# Patient Record
Sex: MALE | Race: WHITE | NOT HISPANIC OR LATINO | Employment: FULL TIME | URBAN - METROPOLITAN AREA
[De-identification: names, ages, dates, MRNs, and addresses within clinical notes are randomized per-mention and may not be internally consistent; named-entity substitution may affect disease eponyms.]

---

## 2017-03-28 ENCOUNTER — GENERIC CONVERSION - ENCOUNTER (OUTPATIENT)
Dept: OTHER | Facility: OTHER | Age: 37
End: 2017-03-28

## 2017-06-14 ENCOUNTER — ALLSCRIPTS OFFICE VISIT (OUTPATIENT)
Dept: OTHER | Facility: OTHER | Age: 37
End: 2017-06-14

## 2017-11-22 ENCOUNTER — ALLSCRIPTS OFFICE VISIT (OUTPATIENT)
Dept: OTHER | Facility: OTHER | Age: 37
End: 2017-11-22

## 2017-11-23 NOTE — PROCEDURES
Assessment    1  Encounter for vasectomy (V25 2) (Z30 2)    Plan  Encounter for vasectomy    · Oxycodone-Acetaminophen 5-325 MG Oral Tablet; Take 1 to 2 tablets every 4 to 6hours as needed for pain   Rx By: Ryland Maynard; Dispense: 0 Days ; #:12 Tablet; Refill: 0;Encounter for vasectomy; RUBÉN = N; Print Rx   · Follow-up visit in 3 weeks Evaluation and Treatment  Follow-up  Status: Hold For -Scheduling  Requested for: 22HTP2464   Ordered;Encounter for vasectomy; Ordered By: Ryland Maynard Performed:  Due: 62VIK1019   · (1) TISSUE EXAM; Status: In Progress - Specimen/Data Collected,Retrospective ByProtocol Authorization;   Done: [id number]   Perform:[facility]; JNY:[id number]; Last Updated Priscilla Sheehan; 11/22/2017 2:41:15 PM;Ordered;for vasectomy; Ordered By:Laura Ferrera;  B : RIGHT VAS DEFERENS  B Date/Time: : [id number]  B : Vas Deferens  A : LEFT VAS DEFERENS  A Date/Time: : [id number]  A : Vas Deferens  Impression: : Z30 2    Discussion/Summary  Discussion Summary:   patient is status post vasectomy  I recommended rest, ice, and scrotal support  I've asked that he return in the next 2-3 weeks for a post vasectomy check  I have prescribed Vicodin to take as needed  The patient understands that he is not yet considered sterile  I have recommended semen analyses at 6 and 8 weeks post procedure  In the interim I have recommended contraception to avoid an undesired pregnancy  Chief Complaint  Chief Complaint Free Text Note Form: Patient presents for Vasectomy - consent signed , in EHR      History of Present Illness  HPI: Chelle Prabhakar is a 41-year-old male who returns to undergo elective sterilization with vasectomy  Risk and benefits of the procedure were discussed and reviewed  Informed consent was previously obtained  The patient has taken Ativan as prescribed  Active Problems  1  Encounter for vasectomy (V25 2) (Z30 2)   2   Encounter for vasectomy counseling (V25 09) (Z30 09)    Surgical History  1  History of Arthroscopy Shoulder Right   2  History of Oral Surgery Tooth Extraction [place] Tooth    Social History     ·    · Never a smoker   · Social alcohol use (Z78 9)    Current Meds   1  LORazepam 2 MG Oral Tablet; one tablet one hour prior to procedure; Therapy: 00UZS2332 to (Evaluate:15Jun2017); Last Rx:14Jun2017 Ordered    Allergies  1  No Known Drug Allergies    Vitals  Vital Signs    Recorded: 22Nov2017 02:11PM   Heart Rate 60   Systolic 805   Diastolic 76   Height 6 ft    Weight 221 lb    BMI Calculated 29 97   BSA Calculated 2 22       Procedure  Procedure Note:  and benefits of vasectomy were previously discussed  Informed consent was obtained at his prior office visit  The patient had taken Ativan as prescribed  patient was placed in the supine position  His genitalia was prepped and draped in a sterile fashion  The left vas deferens was grasped and presented to the midline of the scrotum  2% lidocaine was used to anesthetize the skin, subcutaneous tissue, and left vas deferens  A scalpeless opening was made in the midline of the scrotum  The left vas deferens was grasped and presented into the surgical field  A #15 blade was used to make a longitudinal incision in the sheath of the vas deferens  The vas itself was then dissected free from the surrounding tissue  Clamps were placed proximally and distally and a 1 cm segment of the vas deferens was excised  Silk ties were applied and exposed ends were fulgurated  Hemostasis was excellent  The vas was returned to its natural anatomic position and the same procedure was then repeated on the patient's right side  A single stitch was placed through the skin and dartos to reapproximate the defect  Bacitracin ointment was applied        Future Appointments    Date/Time Provider Specialty Site   12/06/2017 02:30 PM Damaso Bueno, Min Daniels  Urology  [address]       Signatures   Electronically signed by : Wilber Hooks MD; Nov 22 2017  2:56PM EST                       (Author)

## 2017-12-06 ENCOUNTER — GENERIC CONVERSION - ENCOUNTER (OUTPATIENT)
Dept: OTHER | Facility: OTHER | Age: 37
End: 2017-12-06

## 2018-01-12 VITALS
SYSTOLIC BLOOD PRESSURE: 122 MMHG | HEIGHT: 72 IN | WEIGHT: 224 LBS | DIASTOLIC BLOOD PRESSURE: 84 MMHG | HEART RATE: 60 BPM | BODY MASS INDEX: 30.34 KG/M2

## 2018-01-13 VITALS
BODY MASS INDEX: 29.93 KG/M2 | SYSTOLIC BLOOD PRESSURE: 120 MMHG | DIASTOLIC BLOOD PRESSURE: 76 MMHG | HEART RATE: 60 BPM | HEIGHT: 72 IN | WEIGHT: 221 LBS

## 2018-01-24 VITALS
HEIGHT: 72 IN | BODY MASS INDEX: 31.42 KG/M2 | SYSTOLIC BLOOD PRESSURE: 124 MMHG | DIASTOLIC BLOOD PRESSURE: 80 MMHG | WEIGHT: 232 LBS

## 2018-04-18 ENCOUNTER — APPOINTMENT (EMERGENCY)
Dept: CT IMAGING | Facility: HOSPITAL | Age: 38
End: 2018-04-18
Payer: COMMERCIAL

## 2018-04-18 ENCOUNTER — ANESTHESIA EVENT (EMERGENCY)
Dept: PERIOP | Facility: HOSPITAL | Age: 38
End: 2018-04-18
Payer: COMMERCIAL

## 2018-04-18 ENCOUNTER — HOSPITAL ENCOUNTER (OUTPATIENT)
Facility: HOSPITAL | Age: 38
Discharge: HOME | End: 2018-04-19
Attending: EMERGENCY MEDICINE | Admitting: SURGERY
Payer: COMMERCIAL

## 2018-04-18 ENCOUNTER — ANESTHESIA (EMERGENCY)
Dept: PERIOP | Facility: HOSPITAL | Age: 38
End: 2018-04-18
Payer: COMMERCIAL

## 2018-04-18 DIAGNOSIS — K35.80 ACUTE APPENDICITIS: Primary | ICD-10-CM

## 2018-04-18 LAB
ALBUMIN SERPL BCP-MCNC: 4.4 G/DL (ref 3.5–5)
ALP SERPL-CCNC: 92 U/L (ref 46–116)
ALT SERPL W P-5'-P-CCNC: 21 U/L (ref 12–78)
ANION GAP SERPL CALCULATED.3IONS-SCNC: 7 MMOL/L (ref 4–13)
AST SERPL W P-5'-P-CCNC: 19 U/L (ref 5–45)
BASOPHILS # BLD AUTO: 0.03 THOUSANDS/ΜL (ref 0–0.1)
BASOPHILS NFR BLD AUTO: 0 % (ref 0–1)
BILIRUB SERPL-MCNC: 0.67 MG/DL (ref 0.2–1)
BILIRUB UR QL STRIP: NEGATIVE
BUN SERPL-MCNC: 11 MG/DL (ref 5–25)
CALCIUM SERPL-MCNC: 9.3 MG/DL (ref 8.3–10.1)
CHLORIDE SERPL-SCNC: 103 MMOL/L (ref 100–108)
CLARITY UR: CLEAR
CLARITY, POC: CLEAR
CO2 SERPL-SCNC: 30 MMOL/L (ref 21–32)
COLOR UR: YELLOW
COLOR, POC: YELLOW
CREAT SERPL-MCNC: 0.85 MG/DL (ref 0.6–1.3)
EOSINOPHIL # BLD AUTO: 0.12 THOUSAND/ΜL (ref 0–0.61)
EOSINOPHIL NFR BLD AUTO: 1 % (ref 0–6)
ERYTHROCYTE [DISTWIDTH] IN BLOOD BY AUTOMATED COUNT: 12.5 % (ref 11.6–15.1)
GFR SERPL CREATININE-BSD FRML MDRD: 110 ML/MIN/1.73SQ M
GLUCOSE SERPL-MCNC: 107 MG/DL (ref 65–140)
GLUCOSE UR STRIP-MCNC: NEGATIVE MG/DL
HCT VFR BLD AUTO: 42.3 % (ref 36.5–49.3)
HGB BLD-MCNC: 15 G/DL (ref 12–17)
HGB UR QL STRIP.AUTO: NEGATIVE
KETONES UR STRIP-MCNC: NEGATIVE MG/DL
LEUKOCYTE ESTERASE UR QL STRIP: NEGATIVE
LIPASE SERPL-CCNC: 174 U/L (ref 73–393)
LYMPHOCYTES # BLD AUTO: 1.2 THOUSANDS/ΜL (ref 0.6–4.47)
LYMPHOCYTES NFR BLD AUTO: 8 % (ref 14–44)
MCH RBC QN AUTO: 29.9 PG (ref 26.8–34.3)
MCHC RBC AUTO-ENTMCNC: 35.5 G/DL (ref 31.4–37.4)
MCV RBC AUTO: 84 FL (ref 82–98)
MONOCYTES # BLD AUTO: 0.82 THOUSAND/ΜL (ref 0.17–1.22)
MONOCYTES NFR BLD AUTO: 6 % (ref 4–12)
NEUTROPHILS # BLD AUTO: 12.53 THOUSANDS/ΜL (ref 1.85–7.62)
NEUTS SEG NFR BLD AUTO: 85 % (ref 43–75)
NITRITE UR QL STRIP: NEGATIVE
NRBC BLD AUTO-RTO: 0 /100 WBCS
PH UR STRIP.AUTO: 7 [PH] (ref 4.5–8)
PLATELET # BLD AUTO: 236 THOUSANDS/UL (ref 149–390)
PMV BLD AUTO: 10.8 FL (ref 8.9–12.7)
POTASSIUM SERPL-SCNC: 4.2 MMOL/L (ref 3.5–5.3)
PROT SERPL-MCNC: 7.9 G/DL (ref 6.4–8.2)
PROT UR STRIP-MCNC: NEGATIVE MG/DL
RBC # BLD AUTO: 5.02 MILLION/UL (ref 3.88–5.62)
SODIUM SERPL-SCNC: 140 MMOL/L (ref 136–145)
SP GR UR STRIP.AUTO: 1.02 (ref 1–1.03)
UROBILINOGEN UR QL STRIP.AUTO: 0.2 E.U./DL
WBC # BLD AUTO: 14.7 THOUSAND/UL (ref 4.31–10.16)

## 2018-04-18 PROCEDURE — 44970 LAPAROSCOPY APPENDECTOMY: CPT | Performed by: SURGERY

## 2018-04-18 PROCEDURE — 74177 CT ABD & PELVIS W/CONTRAST: CPT

## 2018-04-18 PROCEDURE — 99285 EMERGENCY DEPT VISIT HI MDM: CPT

## 2018-04-18 PROCEDURE — 44970 LAPAROSCOPY APPENDECTOMY: CPT | Performed by: PHYSICIAN ASSISTANT

## 2018-04-18 PROCEDURE — 88304 TISSUE EXAM BY PATHOLOGIST: CPT | Performed by: PATHOLOGY

## 2018-04-18 PROCEDURE — 85025 COMPLETE CBC W/AUTO DIFF WBC: CPT | Performed by: EMERGENCY MEDICINE

## 2018-04-18 PROCEDURE — 81003 URINALYSIS AUTO W/O SCOPE: CPT

## 2018-04-18 PROCEDURE — 80053 COMPREHEN METABOLIC PANEL: CPT | Performed by: EMERGENCY MEDICINE

## 2018-04-18 PROCEDURE — 36415 COLL VENOUS BLD VENIPUNCTURE: CPT | Performed by: EMERGENCY MEDICINE

## 2018-04-18 PROCEDURE — 81002 URINALYSIS NONAUTO W/O SCOPE: CPT | Performed by: EMERGENCY MEDICINE

## 2018-04-18 PROCEDURE — 96374 THER/PROPH/DIAG INJ IV PUSH: CPT

## 2018-04-18 PROCEDURE — 96375 TX/PRO/DX INJ NEW DRUG ADDON: CPT

## 2018-04-18 PROCEDURE — 83690 ASSAY OF LIPASE: CPT | Performed by: EMERGENCY MEDICINE

## 2018-04-18 RX ORDER — GLYCOPYRROLATE 0.2 MG/ML
INJECTION INTRAMUSCULAR; INTRAVENOUS AS NEEDED
Status: DISCONTINUED | OUTPATIENT
Start: 2018-04-18 | End: 2018-04-18 | Stop reason: SURG

## 2018-04-18 RX ORDER — CEFAZOLIN SODIUM 1 G/3ML
INJECTION, POWDER, FOR SOLUTION INTRAMUSCULAR; INTRAVENOUS AS NEEDED
Status: DISCONTINUED | OUTPATIENT
Start: 2018-04-18 | End: 2018-04-18 | Stop reason: SURG

## 2018-04-18 RX ORDER — SODIUM CHLORIDE 9 MG/ML
INJECTION, SOLUTION INTRAVENOUS AS NEEDED
Status: DISCONTINUED | OUTPATIENT
Start: 2018-04-18 | End: 2018-04-18 | Stop reason: HOSPADM

## 2018-04-18 RX ORDER — PANTOPRAZOLE SODIUM 40 MG/1
40 INJECTION, POWDER, FOR SOLUTION INTRAVENOUS DAILY
Status: DISCONTINUED | OUTPATIENT
Start: 2018-04-19 | End: 2018-04-19 | Stop reason: HOSPADM

## 2018-04-18 RX ORDER — PROPOFOL 10 MG/ML
INJECTION, EMULSION INTRAVENOUS AS NEEDED
Status: DISCONTINUED | OUTPATIENT
Start: 2018-04-18 | End: 2018-04-18 | Stop reason: SURG

## 2018-04-18 RX ORDER — FENTANYL CITRATE/PF 50 MCG/ML
50 SYRINGE (ML) INJECTION
Status: DISCONTINUED | OUTPATIENT
Start: 2018-04-18 | End: 2018-04-18 | Stop reason: HOSPADM

## 2018-04-18 RX ORDER — ONDANSETRON 4 MG/1
4 TABLET, ORALLY DISINTEGRATING ORAL EVERY 4 HOURS PRN
Status: DISCONTINUED | OUTPATIENT
Start: 2018-04-18 | End: 2018-04-19 | Stop reason: HOSPADM

## 2018-04-18 RX ORDER — ACETAMINOPHEN 325 MG/1
650 TABLET ORAL EVERY 6 HOURS PRN
Status: DISCONTINUED | OUTPATIENT
Start: 2018-04-18 | End: 2018-04-19 | Stop reason: HOSPADM

## 2018-04-18 RX ORDER — SODIUM CHLORIDE 9 MG/ML
125 INJECTION, SOLUTION INTRAVENOUS CONTINUOUS
Status: DISCONTINUED | OUTPATIENT
Start: 2018-04-18 | End: 2018-04-18

## 2018-04-18 RX ORDER — ONDANSETRON 2 MG/ML
4 INJECTION INTRAMUSCULAR; INTRAVENOUS ONCE
Status: COMPLETED | OUTPATIENT
Start: 2018-04-18 | End: 2018-04-18

## 2018-04-18 RX ORDER — SUCCINYLCHOLINE CHLORIDE 20 MG/ML
INJECTION INTRAMUSCULAR; INTRAVENOUS AS NEEDED
Status: DISCONTINUED | OUTPATIENT
Start: 2018-04-18 | End: 2018-04-18 | Stop reason: SURG

## 2018-04-18 RX ORDER — NICOTINE 21 MG/24HR
14 PATCH, TRANSDERMAL 24 HOURS TRANSDERMAL DAILY
Status: DISCONTINUED | OUTPATIENT
Start: 2018-04-19 | End: 2018-04-19 | Stop reason: HOSPADM

## 2018-04-18 RX ORDER — ONDANSETRON 2 MG/ML
4 INJECTION INTRAMUSCULAR; INTRAVENOUS EVERY 4 HOURS PRN
Status: DISCONTINUED | OUTPATIENT
Start: 2018-04-18 | End: 2018-04-19 | Stop reason: HOSPADM

## 2018-04-18 RX ORDER — DEXTROSE AND SODIUM CHLORIDE 5; .45 G/100ML; G/100ML
125 INJECTION, SOLUTION INTRAVENOUS CONTINUOUS
Status: DISCONTINUED | OUTPATIENT
Start: 2018-04-18 | End: 2018-04-19

## 2018-04-18 RX ORDER — LIDOCAINE HYDROCHLORIDE 10 MG/ML
INJECTION, SOLUTION INFILTRATION; PERINEURAL AS NEEDED
Status: DISCONTINUED | OUTPATIENT
Start: 2018-04-18 | End: 2018-04-18 | Stop reason: SURG

## 2018-04-18 RX ORDER — MORPHINE SULFATE 4 MG/ML
2 INJECTION, SOLUTION INTRAMUSCULAR; INTRAVENOUS EVERY 2 HOUR PRN
Status: DISCONTINUED | OUTPATIENT
Start: 2018-04-18 | End: 2018-04-19 | Stop reason: HOSPADM

## 2018-04-18 RX ORDER — DEXTROSE AND SODIUM CHLORIDE 5; .45 G/100ML; G/100ML
80 INJECTION, SOLUTION INTRAVENOUS CONTINUOUS
Status: DISCONTINUED | OUTPATIENT
Start: 2018-04-18 | End: 2018-04-19

## 2018-04-18 RX ORDER — MORPHINE SULFATE 10 MG/ML
2 INJECTION, SOLUTION INTRAMUSCULAR; INTRAVENOUS EVERY 2 HOUR PRN
Status: DISCONTINUED | OUTPATIENT
Start: 2018-04-18 | End: 2018-04-18 | Stop reason: SDUPTHER

## 2018-04-18 RX ORDER — MEPERIDINE HYDROCHLORIDE 50 MG/ML
12.5 INJECTION INTRAMUSCULAR; INTRAVENOUS; SUBCUTANEOUS ONCE AS NEEDED
Status: DISCONTINUED | OUTPATIENT
Start: 2018-04-18 | End: 2018-04-18 | Stop reason: HOSPADM

## 2018-04-18 RX ORDER — MIDAZOLAM HYDROCHLORIDE 1 MG/ML
INJECTION INTRAMUSCULAR; INTRAVENOUS AS NEEDED
Status: DISCONTINUED | OUTPATIENT
Start: 2018-04-18 | End: 2018-04-18 | Stop reason: SURG

## 2018-04-18 RX ORDER — HYDROCODONE BITARTRATE AND ACETAMINOPHEN 5; 325 MG/1; MG/1
1 TABLET ORAL EVERY 4 HOURS PRN
Status: DISCONTINUED | OUTPATIENT
Start: 2018-04-18 | End: 2018-04-19 | Stop reason: HOSPADM

## 2018-04-18 RX ORDER — HYDROCODONE BITARTRATE AND ACETAMINOPHEN 5; 325 MG/1; MG/1
1-2 TABLET ORAL EVERY 6 HOURS PRN
Qty: 30 TABLET | Refills: 0 | Status: SHIPPED | OUTPATIENT
Start: 2018-04-18

## 2018-04-18 RX ORDER — ROCURONIUM BROMIDE 10 MG/ML
INJECTION, SOLUTION INTRAVENOUS AS NEEDED
Status: DISCONTINUED | OUTPATIENT
Start: 2018-04-18 | End: 2018-04-18 | Stop reason: SURG

## 2018-04-18 RX ORDER — ONDANSETRON 2 MG/ML
4 INJECTION INTRAMUSCULAR; INTRAVENOUS ONCE AS NEEDED
Status: DISCONTINUED | OUTPATIENT
Start: 2018-04-18 | End: 2018-04-18 | Stop reason: HOSPADM

## 2018-04-18 RX ORDER — ONDANSETRON 2 MG/ML
INJECTION INTRAMUSCULAR; INTRAVENOUS AS NEEDED
Status: DISCONTINUED | OUTPATIENT
Start: 2018-04-18 | End: 2018-04-18 | Stop reason: SURG

## 2018-04-18 RX ORDER — FENTANYL CITRATE 50 UG/ML
INJECTION, SOLUTION INTRAMUSCULAR; INTRAVENOUS AS NEEDED
Status: DISCONTINUED | OUTPATIENT
Start: 2018-04-18 | End: 2018-04-18 | Stop reason: SURG

## 2018-04-18 RX ORDER — KETOROLAC TROMETHAMINE 30 MG/ML
15 INJECTION, SOLUTION INTRAMUSCULAR; INTRAVENOUS ONCE
Status: COMPLETED | OUTPATIENT
Start: 2018-04-18 | End: 2018-04-18

## 2018-04-18 RX ADMIN — ONDANSETRON 4 MG: 2 INJECTION INTRAMUSCULAR; INTRAVENOUS at 09:18

## 2018-04-18 RX ADMIN — MIDAZOLAM HYDROCHLORIDE 2 MG: 1 INJECTION, SOLUTION INTRAMUSCULAR; INTRAVENOUS at 17:49

## 2018-04-18 RX ADMIN — DEXTROSE AND SODIUM CHLORIDE 80 ML/HR: 5; .45 INJECTION, SOLUTION INTRAVENOUS at 20:22

## 2018-04-18 RX ADMIN — GLYCOPYRROLATE 0.4 MG: 0.2 INJECTION, SOLUTION INTRAMUSCULAR; INTRAVENOUS at 18:42

## 2018-04-18 RX ADMIN — NEOSTIGMINE METHYLSULFATE 3 MG: 1 INJECTION, SOLUTION INTRAMUSCULAR; INTRAVENOUS; SUBCUTANEOUS at 18:42

## 2018-04-18 RX ADMIN — IOHEXOL 100 ML: 350 INJECTION, SOLUTION INTRAVENOUS at 11:27

## 2018-04-18 RX ADMIN — ROCURONIUM BROMIDE 35 MG: 10 INJECTION INTRAVENOUS at 17:58

## 2018-04-18 RX ADMIN — GLYCOPYRROLATE 0.2 MG: 0.2 INJECTION, SOLUTION INTRAMUSCULAR; INTRAVENOUS at 18:33

## 2018-04-18 RX ADMIN — ROCURONIUM BROMIDE 10 MG: 10 INJECTION INTRAVENOUS at 18:20

## 2018-04-18 RX ADMIN — CEFAZOLIN 2000 MG: 1 INJECTION, POWDER, FOR SOLUTION INTRAVENOUS at 17:58

## 2018-04-18 RX ADMIN — SODIUM CHLORIDE: 0.9 INJECTION, SOLUTION INTRAVENOUS at 18:20

## 2018-04-18 RX ADMIN — ONDANSETRON HYDROCHLORIDE 4 MG: 2 INJECTION, SOLUTION INTRAVENOUS at 17:54

## 2018-04-18 RX ADMIN — ACETAMINOPHEN 650 MG: 325 TABLET, FILM COATED ORAL at 20:21

## 2018-04-18 RX ADMIN — PROPOFOL 200 MG: 10 INJECTION, EMULSION INTRAVENOUS at 17:54

## 2018-04-18 RX ADMIN — SODIUM CHLORIDE 125 ML/HR: 0.9 INJECTION, SOLUTION INTRAVENOUS at 17:49

## 2018-04-18 RX ADMIN — FENTANYL CITRATE 50 MCG: 50 INJECTION, SOLUTION INTRAMUSCULAR; INTRAVENOUS at 18:22

## 2018-04-18 RX ADMIN — LIDOCAINE HYDROCHLORIDE 60 MG: 10 INJECTION, SOLUTION INFILTRATION; PERINEURAL at 17:54

## 2018-04-18 RX ADMIN — KETOROLAC TROMETHAMINE 15 MG: 30 INJECTION, SOLUTION INTRAMUSCULAR at 09:18

## 2018-04-18 RX ADMIN — HYDROCODONE BITARTRATE AND ACETAMINOPHEN 1 TABLET: 5; 325 TABLET ORAL at 21:28

## 2018-04-18 RX ADMIN — Medication 500 MG: at 18:02

## 2018-04-18 RX ADMIN — FENTANYL CITRATE 50 MCG: 50 INJECTION, SOLUTION INTRAMUSCULAR; INTRAVENOUS at 18:53

## 2018-04-18 RX ADMIN — SUCCINYLCHOLINE CHLORIDE 100 MG: 20 INJECTION, SOLUTION INTRAMUSCULAR; INTRAVENOUS at 17:54

## 2018-04-18 RX ADMIN — ROCURONIUM BROMIDE 5 MG: 10 INJECTION INTRAVENOUS at 17:54

## 2018-04-18 RX ADMIN — DEXAMETHASONE SODIUM PHOSPHATE 4 MG: 10 INJECTION INTRAMUSCULAR; INTRAVENOUS at 17:54

## 2018-04-18 RX ADMIN — FENTANYL CITRATE 100 MCG: 50 INJECTION, SOLUTION INTRAMUSCULAR; INTRAVENOUS at 17:52

## 2018-04-18 NOTE — DISCHARGE INSTRUCTIONS
[facility] Instructions  Dr Harman Saucedo MD, FACS    1  General: You will feel pulling sensations around the wound or funny aches and pains around the incisions  This is normal  Even minor surgery is a change in your body and this is your bodys way of reaction to it  If you have had abdominal surgery, it may help to support the incision with a small pillow or blanket for comfort when moving or coughing  2  Wound care: Make sure to remove the bandage in about 24 hours, unless instructed otherwise  You usually don't have to redress the wound after 24-48 hours, unless for comfort  Keep the incision clean and dry  Let air get to it  If this Steri-Strips fall off, just keep the wound clean  3  Water: You may shower over the wound, unless there are drain tubes left in place  Do not bathe or use a pool or hot tub until cleared by the physician  You may shower right over the staples or Steri-Strips and packing dry when you are done  4  Activity: You may go up and down stairs, walk as much as you are comfortable, but walk at least 3 times each day  If you have had abdominal surgery, do not lift anything heavier than 15 pounds for at least 2-4 weeks, unless cleared by the doctor  5  Diet: You may resume a regular diet  If you had a same-day surgery or overnight stay surgery, you may wish to eat lightly for a few days: soups, crackers, and sandwiches  You may resume a regular diet when ready  6  Medications: Resume all of your previous medications, unless told otherwise by the doctor  Avoid aspirin or ibuprofen (Advil, Motrin, etc ) products for 2-3 days after the date of surgery  You may, at that time, began to take them again  Tylenol is always fine, unless you are taking any narcotic pain medication containing Tylenol (such as Percocet, Darvocet, Vicodin, or anything containing acetaminophen)  Do not take Tylenol if you're taking these medications   You do not need to take the narcotic pain medications unless you are having significant pain and discomfort  7  Driving: You will need someone to drive you home on the day of surgery  Do not drive or make any important decisions while on narcotic pain medication or 24 hours and after anesthesia or sedation for surgery  Generally, you may drive when your off all narcotic pain medications  8  Upset Stomach: You may take Maalox, Tums, or similar items for an upset stomach  If your narcotic pain medication causes an upset stomach, do not take it on an empty stomach  Try taking it with at least some crackers or toast      9  Constipation: Patients often experienced constipation after surgery  You may take over-the-counter medication for this, such as Metamucil, Senokot, Dulcolax, milk of magnesia, etc  You may take a suppository unless you have had anorectal surgery such as a procedure on your hemorrhoids  If you experience significant nausea or vomiting after abdominal surgery, call the office before trying any of these medications  10  Call the office: If you are experiencing any of the following, fevers above 101 5°, significant nausea or vomiting, if the wound develops drainage and/or is excessive redness around the wound, or if you have significant diarrhea or other worsening symptoms  11  Pain: You may be given a prescription for pain  This will be given to the hospital, the day of surgery  12  Sexual Activity: You may resume sexual activity when you feel ready and comfortable and your incision is sealed and healed without apparent infection risk  13  Urination: If you haven't urinated in 6 hours, go directly to the ER for evaluation for urinary retention       [address], [address]  [place], [address]   Phone: [phone]

## 2018-04-18 NOTE — ANESTHESIA PREPROCEDURE EVALUATION
Review of Systems/Medical History  Patient summary reviewed  Chart reviewed      Cardiovascular  Negative cardio ROS    Pulmonary  Smoker cigarette smoker  , Tobacco cessation counseling given ,        GI/Hepatic      Comment: Acute appendicitis     Negative  ROS        Endo/Other  Negative endo/other ROS      GYN       Hematology  Negative hematology ROS      Musculoskeletal  Negative musculoskeletal ROS        Neurology  Negative neurology ROS      Psychology   Negative psychology ROS              Physical Exam    Airway    Mallampati score: II  TM Distance: <3 FB  Neck ROM: full     Dental   No notable dental hx     Cardiovascular  Comment: Negative ROS, Rhythm: regular, Rate: normal, Cardiovascular exam normal    Pulmonary  Pulmonary exam normal     Other Findings        Anesthesia Plan  ASA Score- 2 Emergent    Anesthesia Type- general with ASA Monitors  Additional Monitors:   Airway Plan: ETT  Plan Factors- Patient instructed to abstain from smoking on day of procedure  Patient did not smoke on day of surgery  Induction- intravenous  Postoperative Plan- Plan for postoperative opioid use  Planned trial extubation    Informed Consent- Anesthetic plan and risks discussed with patient

## 2018-04-18 NOTE — H&P
H&P Exam - Izabella Pelayo 43 y o  male MRN: [id number]    Unit/Bed#: ED 22 Encounter: [id number]    Assessment:  1  Acute retrocolonic appendicitis    Plan:  - will take patient to OR today tentatively at 1730 with Dr Patrick Baer for a laparoscopic vs open appendectomy  - CT scan reviewed  Results show acute uncomplicated retrocolonic appendicitis  - continue NPO  - will start antibiotics during surgery  - The benefits, risks, and alternatives of the surgery were discussed with the patient  Consent was signed and is in chart  All questions/concerns were answered  History of Present Illness   Patient is a 69-year-old male with abdominal pain  He states that the pain is mainly in the right lower quadrant  He states that the pain had started at 7 o'clock this morning  He has not experienced this pain before  He states that the pain is predominantly on the right side of his abdomen, and does not radiate  He states that when he makes sudden movements, he experiences sharp pain in his right side  He states that when he lays flat the pain subsides, but does not completely go away  He states that the pain is constant, and gets worse when he stretches out his torso  He denies any previous surgeries in the abdomen, but states that he has had multiple shoulder surgeries  He admits to having nausea this morning, but denies any vomiting, fever, chills, shortness of breath, or chest pains  Last time patient ate was last night  Review of Systems   Constitutional: Positive for appetite change  Negative for chills, fatigue and fever  HENT: Negative  Eyes: Negative  Respiratory: Negative for chest tightness, shortness of breath and wheezing  Cardiovascular: Negative for chest pain  Gastrointestinal: Positive for abdominal pain and nausea  Negative for constipation, diarrhea and vomiting  Endocrine: Negative  Genitourinary: Negative  Musculoskeletal: Negative for back pain and neck stiffness  Neurological: Negative for dizziness, weakness and numbness  Historical Information   History reviewed  No pertinent past medical history  Past Surgical History:   Procedure Laterality Date    SHOULDER ARTHROSCOPY Bilateral      Social History   History   Alcohol Use    Yes     Comment: socially     History   Drug Use No     History   Smoking Status    Light Tobacco Smoker    Types: Cigars   Smokeless Tobacco    Never Used     Family History: non-contributory    Meds/Allergies   all medications and allergies reviewed  No Known Allergies    Objective   First Vitals:   Blood Pressure: 148/71 (04/18/18 0809)  Pulse: 78 (04/18/18 0809)  Temperature: 97 5 °F (36 4 °C) (04/18/18 0809)  Temp Source: Temporal (04/18/18 0809)  Respirations: 16 (04/18/18 0809)  Weight - Scale: 102 kg (225 lb) (04/18/18 0809)  SpO2: 98 % (04/18/18 0809)    Current Vitals:   Blood Pressure: 119/68 (04/18/18 1214)  Pulse: 65 (04/18/18 1214)  Temperature: 97 5 °F (36 4 °C) (04/18/18 0809)  Temp Source: Temporal (04/18/18 0809)  Respirations: 16 (04/18/18 1214)  Weight - Scale: 102 kg (225 lb) (04/18/18 0809)  SpO2: 98 % (04/18/18 1214)    No intake or output data in the 24 hours ending 04/18/18 1215    Invasive Devices     Peripheral Intravenous Line            Peripheral IV 04/18/18 Left Antecubital less than 1 day                Physical Exam   Constitutional: He is oriented to person, place, and time  He appears well-developed and well-nourished  No distress  HENT:   Head: Normocephalic and atraumatic  Eyes: Pupils are equal, round, and reactive to light  Right eye exhibits no discharge  Left eye exhibits no discharge  Neck: Normal range of motion  Cardiovascular: Normal rate and regular rhythm  Pulmonary/Chest: Effort normal and breath sounds normal  No respiratory distress  He has no wheezes  He has no rales  He exhibits no tenderness  Abdominal: Soft  Bowel sounds are normal  He exhibits no mass   There is tenderness  Tenderness noted over the right lateral abdominal wall  No tenderness noted on the remainder of the abdomen  No hernias are noted  Musculoskeletal: Normal range of motion  He exhibits no edema or tenderness  Neurological: He is alert and oriented to person, place, and time  Skin: Skin is warm and dry  Psychiatric: He has a normal mood and affect  His behavior is normal  Judgment and thought content normal        Lab Results:  CBC 14 70  Imaging:  CT scan results show uncomplicated retrocolonic appendicitis       Counseling / Coordination of Care: Total floor / unit time spent today 30 minutes

## 2018-04-18 NOTE — ED PROVIDER NOTES
History  Chief Complaint   Patient presents with    Abdominal Pain     Patient reports right sided abdominal pain describes as sharp in nature  Reports nausea, denies diarrhea  Reprots worse when stretching out torso  History provided by:  Patient  Abdominal Pain   Pain location: R side  Pain quality: sharp    Pain radiates to:  Does not radiate  Pain severity:  Severe  Onset quality:  Sudden  Duration:  2 hours  Timing:  Constant  Progression:  Unchanged  Chronicity:  New  Context comment:  Walking  Relieved by:  Nothing  Worsened by: Movement, palpation and position changes  Ineffective treatments:  None tried  Associated symptoms: nausea    Associated symptoms: no chest pain, no chills, no constipation, no cough, no diarrhea, no dysuria, no fatigue, no fever, no hematuria, no shortness of breath, no sore throat and no vomiting    Risk factors: has not had multiple surgeries        None       History reviewed  No pertinent past medical history  Past Surgical History:   Procedure Laterality Date    SHOULDER ARTHROSCOPY Bilateral        History reviewed  No pertinent family history  I have reviewed and agree with the history as documented  Social History   Substance Use Topics    Smoking status: Light Tobacco Smoker     Types: Cigars    Smokeless tobacco: Never Used    Alcohol use Yes      Comment: socially        Review of Systems   Constitutional: Negative for chills, diaphoresis, fatigue and fever  HENT: Negative for congestion, ear pain, rhinorrhea, sinus pressure, sneezing, sore throat and trouble swallowing  Eyes: Negative for pain and visual disturbance  Respiratory: Negative for cough, chest tightness, shortness of breath, wheezing and stridor  Cardiovascular: Negative for chest pain, palpitations and leg swelling  Gastrointestinal: Positive for abdominal pain and nausea  Negative for blood in stool, constipation, diarrhea and vomiting     Endocrine: Negative for polydipsia, polyphagia and polyuria  Genitourinary: Negative for decreased urine volume, dysuria, flank pain, frequency, hematuria, penile pain, penile swelling, scrotal swelling, testicular pain and urgency  Musculoskeletal: Negative for arthralgias and myalgias  Skin: Negative for color change and rash  Neurological: Negative for dizziness, seizures, light-headedness, numbness and headaches  Hematological: Negative for adenopathy  Psychiatric/Behavioral: The patient is not nervous/anxious  Physical Exam  ED Triage Vitals [04/18/18 0809]   Temperature Pulse Respirations Blood Pressure SpO2   97 5 °F (36 4 °C) 78 16 148/71 98 %      Temp Source Heart Rate Source Patient Position - Orthostatic VS BP Location FiO2 (%)   Temporal Monitor Sitting Right arm --      Pain Score       7           Orthostatic Vital Signs  Vitals:    04/18/18 0809 04/18/18 1007   BP: 148/71 119/68   Pulse: 78 72   Patient Position - Orthostatic VS: Sitting        Physical Exam   Constitutional: He is oriented to person, place, and time  He appears well-developed and well-nourished  HENT:   Mouth/Throat: No oropharyngeal exudate  TMs normal bilaterally no pharyngeal erythema no rhinorrhea nontender palpation of sinuses, normal looking turbinates   Eyes: Conjunctivae and EOM are normal    Neck: Normal range of motion  Neck supple  No meningeal signs   Cardiovascular: Normal rate, regular rhythm, normal heart sounds and intact distal pulses  Pulmonary/Chest: Effort normal and breath sounds normal  No respiratory distress  He has no wheezes  He has no rales  He exhibits no tenderness  Abdominal: Soft  Bowel sounds are normal  He exhibits no distension and no mass  There is tenderness (right lateral abdominal wall  nttp over rlg/ruq, remainder of abdomen)  There is no rebound and no guarding  No hernia  No cvat   Musculoskeletal: Normal range of motion  He exhibits no edema  Lymphadenopathy:     He has no cervical adenopathy  Neurological: He is alert and oriented to person, place, and time  No cranial nerve deficit  Skin: No rash noted  No erythema  No edema   Psychiatric: He has a normal mood and affect  His behavior is normal    Nursing note and vitals reviewed        ED Medications  Medications   ketorolac (TORADOL) injection 15 mg (15 mg Intravenous Given 4/18/18 0918)   ondansetron (ZOFRAN) injection 4 mg (4 mg Intravenous Given 4/18/18 0918)   iohexol (OMNIPAQUE) 350 MG/ML injection (MULTI-DOSE) 100 mL (100 mL Intravenous Given 4/18/18 1127)       Diagnostic Studies  Results Reviewed     Procedure Component Value Units Date/Time    POCT urinalysis dipstick [[id number]]  (Normal) Resulted:  04/18/18 1020    Lab Status:  Final result Specimen:  Urine Updated:  04/18/18 1020     Color, UA yellow     Clarity, UA clear    ED Urine Macroscopic [[id number]]  (Normal) Collected:  04/18/18 1016    Lab Status:  Final result Specimen:  Urine Updated:  04/18/18 1016     Color, UA Yellow     Clarity, UA Clear     pH, UA 7 0     Leukocytes, UA Negative     Nitrite, UA Negative     Protein, UA Negative mg/dl      Glucose, UA Negative mg/dl      Ketones, UA Negative mg/dl      Urobilinogen, UA 0 2 E U /dl      Bilirubin, UA Negative     Blood, UA Negative     Specific Gravity, UA 1 020    Narrative:       CLIN[facility]K RESULT    Comprehensive metabolic panel [[id number]] Collected:  04/18/18 0918    Lab Status:  Final result Specimen:  Blood from Arm, Left Updated:  04/18/18 0942     Sodium 140 mmol/L      Potassium 4 2 mmol/L      Chloride 103 mmol/L      CO2 30 mmol/L      Anion Gap 7 mmol/L      BUN 11 mg/dL      Creatinine 0 85 mg/dL      Glucose 107 mg/dL      Calcium 9 3 mg/dL      AST 19 U/L      ALT 21 U/L      Alkaline Phosphatase 92 U/L      Total Protein 7 9 g/dL      Albumin 4 4 g/dL      Total Bilirubin 0 67 mg/dL      eGFR 110 ml/min/1 73sq m     Narrative:         National Kidney Disease Education Program recommendations are as follows:  GFR calculation is accurate only with a steady state creatinine  Chronic Kidney disease less than 60 ml/min/1 73 sq  meters  Kidney failure less than 15 ml/min/1 73 sq  meters  Lipase [[id number]]  (Normal) Collected:  04/18/18 0918    Lab Status:  Final result Specimen:  Blood from Arm, Left Updated:  04/18/18 0942     Lipase 174 u/L     CBC and differential [[id number]]  (Abnormal) Collected:  04/18/18 0918    Lab Status:  Final result Specimen:  Blood from Arm, Left Updated:  04/18/18 0930     WBC 14 70 (H) Thousand/uL      RBC 5 02 Million/uL      Hemoglobin 15 0 g/dL      Hematocrit 42 3 %      MCV 84 fL      MCH 29 9 pg      MCHC 35 5 g/dL      RDW 12 5 %      MPV 10 8 fL      Platelets 898 Thousands/uL      nRBC 0 /100 WBCs      Neutrophils Relative 85 (H) %      Lymphocytes Relative 8 (L) %      Monocytes Relative 6 %      Eosinophils Relative 1 %      Basophils Relative 0 %      Neutrophils Absolute 12 53 (H) Thousands/µL      Lymphocytes Absolute 1 20 Thousands/µL      Monocytes Absolute 0 82 Thousand/µL      Eosinophils Absolute 0 12 Thousand/µL      Basophils Absolute 0 03 Thousands/µL                  CT abdomen pelvis with contrast   ED Interpretation by Rajesh Uriarte MD (04/18 5986)   Acute uncomplicated retrocolonic appendicitis  Final Result by Danya Khan MD (04/18 1139)      Acute uncomplicated retrocolonic appendicitis         I personally discussed this study with Fantasma Class on 4/18/2018 at 11:36 AM                      Workstation performed: [id number]                    Procedures  Procedures       Phone Contacts  ED Phone Contact    ED Course  ED Course as of Apr 18 1148 Wed Apr 18, 2018   1011 Will ct to r/o acute intraabdominal/pelvic pahtology WBC: (!) 14 70                               MDM  Number of Diagnoses or Management Options  Diagnosis management comments: Right side pain with benign exam-will check abdominal labs, urine dip, treat symptoms, reassess    CritCare Time    Disposition  Final diagnoses:   Acute appendicitis     Time reflects when diagnosis was documented in both MDM as applicable and the Disposition within this note     Time User Action Codes Description Comment    4/18/2018 11:44 AM Chacha Vasquez Add [K31 80] Acute appendicitis       ED Disposition     ED Disposition Condition Comment    Send to OR        Follow-up Information    None       Patient's Medications    No medications on file     No discharge procedures on file      ED Provider  Electronically Signed by           Elaine Li MD  04/18/18 8672

## 2018-04-18 NOTE — OP NOTE
APPENDECTOMY LAPAROSCOPIC  Postoperative Note  PATIENT NAME: Paula Schulz  : 1980  MRN: [id number]  [facility] OR ROOM 06    Surgery Date: 2018    Pre operative diagnosis:  Acute appendicitis [K35 80]    Operative Indications:  Acute Appendicitis       Consent:  The risks, benefits, and alternatives to the surgery were discussed with the patient and/or with the family prior to surgery, personally by Dr Corazon Mccracken  If the consent was obtained by the physician assistant or other representative, the consent was reviewed once again personally by the operating physician  Common complications particular for this procedure as well as unusual complications were discussed, including but not limited to:  bleeding, wound infection, prolonged wound healing, open wounds, reoperation, leak from the bowel or viscus, leak from the bile duct or injury to adjacent or other organs or blood vessels in the abdomen  If the surgery was laparoscopic, it was discussed that possible open surgery could also occur during that same surgery and is always an option in laparoscopic surgery and possible reoperation  A  was used if necessary  The patient expressed understanding of the issues discussed and wished and consented to the procedure to proceed  All questions were answered  Dr Corazon Mccracken personally discussed the informed consent with this patient  Operative Findings:  Retrocecal appendix  Inflammation  No perforation    Post operative diagnosis:   Post-Op Diagnosis Codes:     * Acute appendicitis [K35 80]    Procedure:   Procedure(s):  APPENDECTOMY LAPAROSCOPIC    Surgeon(s) and Role:     * Jone York MD - Primary     * Alma Cardoza PA-C - Assisting    The Physician Assistant was medically necessary for surgical safety the case including suturing, retraction, and hemostasis  No qualified resident was available  I was present for the entire procedure       Drains:       Specimens:  ID Type Source Tests Collected by Time Destination   1 :  Tissue Appendix TISSUE EXAM Jasbir Rod MD 4/18/2018 1823        Estimated Blood Loss:   Minimal    Anesthesia Type:   Choice     Procedure: The patient was seen again in the Holding Room  The risks, benefits, complications, treatment options, and expected outcomes were discussed with the patient and/or family  The possibilities of reaction to medication, pulmonary aspiration, perforation of viscus, bleeding, recurrent infection, finding a normal appendix, the need for additional procedures, failure to diagnose a condition, and creating a complication requiring transfusion or operation were discussed  There was concurrence with the proposed plan and informed consent was obtained  The site of surgery was properly noted/marked  The patient was taken to Operating Room, identified as [place] and the procedure verified as Appendectomy  A Time Out was held after the prep and draping,  and the above information confirmed  The patient was placed in the supine position and general anesthesia was induced, along with placement of orogastric tube, Venodyne boots, and a Angela catheter  The abdomen was prepped and draped in a sterile fashion  An infraumbilical incision was made and an open technique was used to enter the abdomen  A port was placed and a pneumoperitoneum created  Additional ports were placed under direct vision as needed  A careful evaluation of the entire abdomen was carried out  The patient was placed in Trendelenburg and left lateral decubitus position  The small intestines were retracted in the cephalad and left lateral direction away from the pelvis and right lower quadrant  There was visualized an  inflamed appendix that was extending retrocecal far up along the side wall on the right    There was no evidence of perforation  The appendix was carefully dissected  A window was made in the mesoappendix at the base of the appendix   Harmonic scapel and cautery were used to take the mesoappendix  The appendix was divided at its base using an endo-VICTORINO stapler, at the level of the cecum  There was no evidence of bleeding, leakage, or complication after division of the appendix  Irrigation was also performed and irrigate suctioned from the abdomen as well  The larger port site fascia was closed using a non-or minimally absorbable suture  All skin incisions were closed using MonocryI suture  The instrument, sponge, and needle counts were correct at the conclusion of the case  Some portions of this record may have been generated with voice recognition software  There may be translation, syntax,  or grammatical errors  Occasional wrong word or "sound-a-like" substitutions may have occurred due to the inherent limitations of the voice recognition software  Read the chart carefully and recognize, using context, where substations may have occurred  If you have any questions, please contact the dictating provider for clarification or correction, as needed       Complications: None    Condition: Stable to PACU    SIGNATURE: Larry Chowdary MD   DATE: April 18, 2018   TIME: 6:54 PM

## 2018-04-19 VITALS
SYSTOLIC BLOOD PRESSURE: 125 MMHG | TEMPERATURE: 99 F | DIASTOLIC BLOOD PRESSURE: 81 MMHG | HEART RATE: 74 BPM | BODY MASS INDEX: 30.52 KG/M2 | RESPIRATION RATE: 19 BRPM | WEIGHT: 225 LBS | OXYGEN SATURATION: 96 %

## 2018-04-19 LAB
ALBUMIN SERPL BCP-MCNC: 3.4 G/DL (ref 3.5–5)
ALP SERPL-CCNC: 73 U/L (ref 46–116)
ALT SERPL W P-5'-P-CCNC: 16 U/L (ref 12–78)
ANION GAP SERPL CALCULATED.3IONS-SCNC: 9 MMOL/L (ref 4–13)
AST SERPL W P-5'-P-CCNC: 16 U/L (ref 5–45)
BASOPHILS # BLD AUTO: 0.01 THOUSANDS/ΜL (ref 0–0.1)
BASOPHILS NFR BLD AUTO: 0 % (ref 0–1)
BILIRUB SERPL-MCNC: 0.72 MG/DL (ref 0.2–1)
BUN SERPL-MCNC: 10 MG/DL (ref 5–25)
CALCIUM SERPL-MCNC: 8.3 MG/DL (ref 8.3–10.1)
CHLORIDE SERPL-SCNC: 106 MMOL/L (ref 100–108)
CO2 SERPL-SCNC: 26 MMOL/L (ref 21–32)
CREAT SERPL-MCNC: 0.87 MG/DL (ref 0.6–1.3)
EOSINOPHIL # BLD AUTO: 0.01 THOUSAND/ΜL (ref 0–0.61)
EOSINOPHIL NFR BLD AUTO: 0 % (ref 0–6)
ERYTHROCYTE [DISTWIDTH] IN BLOOD BY AUTOMATED COUNT: 12.3 % (ref 11.6–15.1)
GFR SERPL CREATININE-BSD FRML MDRD: 109 ML/MIN/1.73SQ M
GLUCOSE P FAST SERPL-MCNC: 137 MG/DL (ref 65–99)
GLUCOSE SERPL-MCNC: 137 MG/DL (ref 65–140)
HCT VFR BLD AUTO: 39.3 % (ref 36.5–49.3)
HGB BLD-MCNC: 13.7 G/DL (ref 12–17)
LYMPHOCYTES # BLD AUTO: 0.98 THOUSANDS/ΜL (ref 0.6–4.47)
LYMPHOCYTES NFR BLD AUTO: 8 % (ref 14–44)
MCH RBC QN AUTO: 29.7 PG (ref 26.8–34.3)
MCHC RBC AUTO-ENTMCNC: 34.9 G/DL (ref 31.4–37.4)
MCV RBC AUTO: 85 FL (ref 82–98)
MONOCYTES # BLD AUTO: 0.54 THOUSAND/ΜL (ref 0.17–1.22)
MONOCYTES NFR BLD AUTO: 4 % (ref 4–12)
NEUTROPHILS # BLD AUTO: 11.36 THOUSANDS/ΜL (ref 1.85–7.62)
NEUTS SEG NFR BLD AUTO: 88 % (ref 43–75)
NRBC BLD AUTO-RTO: 0 /100 WBCS
PLATELET # BLD AUTO: 252 THOUSANDS/UL (ref 149–390)
PMV BLD AUTO: 11 FL (ref 8.9–12.7)
POTASSIUM SERPL-SCNC: 4.5 MMOL/L (ref 3.5–5.3)
PROT SERPL-MCNC: 6.8 G/DL (ref 6.4–8.2)
RBC # BLD AUTO: 4.62 MILLION/UL (ref 3.88–5.62)
SODIUM SERPL-SCNC: 141 MMOL/L (ref 136–145)
WBC # BLD AUTO: 12.9 THOUSAND/UL (ref 4.31–10.16)

## 2018-04-19 PROCEDURE — 85025 COMPLETE CBC W/AUTO DIFF WBC: CPT | Performed by: SURGERY

## 2018-04-19 PROCEDURE — 99024 POSTOP FOLLOW-UP VISIT: CPT | Performed by: SURGERY

## 2018-04-19 PROCEDURE — C9113 INJ PANTOPRAZOLE SODIUM, VIA: HCPCS | Performed by: SURGERY

## 2018-04-19 PROCEDURE — 80053 COMPREHEN METABOLIC PANEL: CPT | Performed by: SURGERY

## 2018-04-19 RX ADMIN — PANTOPRAZOLE SODIUM 40 MG: 40 INJECTION, POWDER, FOR SOLUTION INTRAVENOUS at 08:31

## 2018-04-19 RX ADMIN — CEFAZOLIN SODIUM 2000 MG: 10 INJECTION, POWDER, FOR SOLUTION INTRAVENOUS at 02:16

## 2018-04-19 RX ADMIN — HYDROCODONE BITARTRATE AND ACETAMINOPHEN 1 TABLET: 5; 325 TABLET ORAL at 07:16

## 2018-04-19 RX ADMIN — METRONIDAZOLE 500 MG: 500 INJECTION, SOLUTION INTRAVENOUS at 03:00

## 2018-04-19 RX ADMIN — CEFAZOLIN SODIUM 2000 MG: 10 INJECTION, POWDER, FOR SOLUTION INTRAVENOUS at 09:20

## 2018-04-19 RX ADMIN — HYDROCODONE BITARTRATE AND ACETAMINOPHEN 1 TABLET: 5; 325 TABLET ORAL at 12:02

## 2018-04-19 RX ADMIN — METRONIDAZOLE 500 MG: 500 INJECTION, SOLUTION INTRAVENOUS at 10:03

## 2018-04-19 NOTE — POST OP PROGRESS NOTES
Progress Note - General Surgery   Marycarmen Nuñez 43 y o  male MRN: [id number]  Unit/Bed#: [facility] -01 Encounter: [id number]      Subjective/Objective     Subjective: Pt is s/p Procedure(s):  APPENDECTOMY LAPAROSCOPIC POD #1  He reports mild pain which is controlled on po pain meds  He is tolerating diet without issue  He has not ambulated from bed  He is voiding with some discomfort, but denies retention  He is not passing gas  He denies nausea, vomiting, fever or chills  Objective:     /81 (BP Location: Right arm)   Pulse 74   Temp 99 °F (37 2 °C) (Temporal)   Resp 19   Wt 102 kg (225 lb)   SpO2 96%   BMI 30 52 kg/m²       Intake/Output Summary (Last 24 hours) at 04/19/18 1204  Last data filed at 04/19/18 0501   Gross per 24 hour   Intake             3242 ml   Output              780 ml   Net             2462 ml       Invasive Devices     Peripheral Intravenous Line            Peripheral IV 04/18/18 Left Antecubital less than 1 day          Drain            NG/OG/Enteral Tube Orogastric 18 Fr Center mouth less than 1 day                Physical Exam:       General Appearance:    Alert, cooperative, no distress, appears stated age   Head:    Normocephalic, without obvious abnormality, atraumatic   Nose:   Nares normal   Throat:   Lips, mucosa normal, pharynx clear   Neck:   Supple, symmetrical   Chest/Breast:   Def   Lungs:     Clear to auscultation bilaterally, respirations unlabored   Heart:    Regular rate and rhythm, S1 and S2 normal, no murmur, rub    or gallop   Abdomen:     Soft, non-tender, non distended, bowel sounds active all four quadrants, no masses, no organomegaly, incisions are covered with steri, bandages without excessive drainage or surrounding induration or erythema     Back:  Full range of motion, no flank or CVA tenderness B/L   Genitalia:    Def   Rectal:    Def   Extremities:   Extremities normal, atraumatic, no cyanosis or edema   Skin:   Skin color, texture, turgor normal, no rashes or lesions   Neurologic:   CNII-XII grossly intact  Normal strength, sensation                 Lab, Imaging and other studies:  I have personally reviewed pertinent lab results  , CBC:   Lab Results   Component Value Date    WBC 12 90 (H) 04/19/2018    HGB 13 7 04/19/2018    HCT 39 3 04/19/2018    MCV 85 04/19/2018     04/19/2018    MCH 29 7 04/19/2018    MCHC 34 9 04/19/2018    RDW 12 3 04/19/2018    MPV 11 0 04/19/2018    NRBC 0 04/19/2018   , CMP:   Lab Results   Component Value Date     04/19/2018    K 4 5 04/19/2018     04/19/2018    CO2 26 04/19/2018    ANIONGAP 9 04/19/2018    BUN 10 04/19/2018    CREATININE 0 87 04/19/2018    GLUCOSE 137 04/19/2018    CALCIUM 8 3 04/19/2018    AST 16 04/19/2018    ALT 16 04/19/2018    ALKPHOS 73 04/19/2018    PROT 6 8 04/19/2018    BILITOT 0 72 04/19/2018    EGFR 109 04/19/2018           VTE Mechanical Prophylaxis: sequential compression device        Assessment/Plan:    Patient Active Problem List   Diagnosis    Acute appendicitis: POD #1 s/p Procedure(s):  APPENDECTOMY LAPAROSCOPIC  Pt stable from surgical standpoint  Coached IS and encouraged ambulation  Will d/c IV fluids to facilitate ambulation  Pt can be discharged after last am abx dose  Dysuria without hematuria due to periop amaro insertion and removal  No retention  Plan of care was discussed with patient's nurse, Frank Llanes  This text is generated with voice recognition software  There may be translation, syntax,  or grammatical errors  If you have any questions, please contact the dictating provider

## 2018-04-19 NOTE — NURSING NOTE
Reviewed discharge instructions with patient and wife  Patient and wife verbalize understanding of all discharge instructions  Prescription provided for pain - patient verbalized understanding of use and when  All questions answered, nothing further to address at this time  IV removed, VSS  Patient discharged

## 2018-04-19 NOTE — PLAN OF CARE
DISCHARGE PLANNING     Discharge to home or other facility with appropriate resources Progressing        INFECTION - ADULT     Absence or prevention of progression during hospitalization Progressing        Knowledge Deficit     Patient/family/caregiver demonstrates understanding of disease process, treatment plan, medications, and discharge instructions Progressing        PAIN - ADULT     Verbalizes/displays adequate comfort level or baseline comfort level Progressing

## 2018-04-19 NOTE — DISCHARGE SUMMARY
Discharge Summary - Sander Vincent 43 y o  male MRN: [id number]    Unit/Bed#: [facility] [address] [facility] [address] 207-01 Encounter: [id number]    Admission Date: 4/18/2018     Discharge Date: 04/19/18    Admitting Diagnosis: Abdominal pain [R10 9]  Acute appendicitis [K35 80]    Secondary Diagnosis: History reviewed  No pertinent past medical history  Discharge Diagnosis: Same    Procedures Performed: Procedure(s):  APPENDECTOMY LAPAROSCOPIC    Consults: none    Hospital Course: Pt admitted with acute appendicitis and taken to OR 4/18/18  He underwent Procedure(s):  APPENDECTOMY LAPAROSCOPIC without issue  He was transferred to med/surg floor for further observation  By POD #1 he was tolerating diet, ambulating, and taking po pain medication  His labwork and vital signs were stable  He was cleared for discharge on 04/19/18  Disposition: The patient should follow up with Aliza Joyner MD in 2 weeks for a post op check and with his PCP as needed for medical management  The patient should refer to the handout "Discharge Instructions" for further information  Call physician for temperature over 101, wound redness or discharge, vomiting, or intolerance to diet  Discharge Medications:  See after visit summary for reconciled discharge medications provided to patient and family  This text is generated with voice recognition software  There may be translation, syntax,  or grammatical errors  If you have any questions, please contact the dictating provider

## 2018-04-20 ENCOUNTER — TELEPHONE (OUTPATIENT)
Dept: SURGERY | Facility: CLINIC | Age: 38
End: 2018-04-20

## 2018-04-20 NOTE — TELEPHONE ENCOUNTER
Spoke w/ pt post appendectomy and in patient hospital stay  Pt states he is doing well ambulating ad jose eduardo and taking in po without difficulty  Taking pain meds as needed with relief obtained  Has not moved bowels yet, discussed options at this time to aid in this  Incisions are clean and dry no drainage noted  Post op appt scheduled for 4/27 per pt request and advised to contact office w/ questions or concerns  Pathology pending and pt is aware

## 2018-04-23 NOTE — TELEPHONE ENCOUNTER
Pt returned call and made aware of pathology results being normal and of appendix tissue  Verbalized understanding and no further questions at this time

## 2018-04-27 ENCOUNTER — APPOINTMENT (OUTPATIENT)
Dept: LAB | Facility: HOSPITAL | Age: 38
End: 2018-04-27
Payer: COMMERCIAL

## 2018-04-27 ENCOUNTER — OFFICE VISIT (OUTPATIENT)
Dept: SURGERY | Facility: CLINIC | Age: 38
End: 2018-04-27

## 2018-04-27 VITALS
DIASTOLIC BLOOD PRESSURE: 76 MMHG | RESPIRATION RATE: 18 BRPM | BODY MASS INDEX: 29.42 KG/M2 | HEIGHT: 73 IN | WEIGHT: 222 LBS | TEMPERATURE: 97.5 F | HEART RATE: 72 BPM | SYSTOLIC BLOOD PRESSURE: 127 MMHG

## 2018-04-27 DIAGNOSIS — R30.0 DYSURIA: Primary | ICD-10-CM

## 2018-04-27 LAB
BILIRUB UR QL STRIP: NEGATIVE
CLARITY UR: CLEAR
COLOR UR: YELLOW
GLUCOSE UR STRIP-MCNC: NEGATIVE MG/DL
HGB UR QL STRIP.AUTO: NEGATIVE
KETONES UR STRIP-MCNC: NEGATIVE MG/DL
LEUKOCYTE ESTERASE UR QL STRIP: NEGATIVE
NITRITE UR QL STRIP: NEGATIVE
PH UR STRIP.AUTO: 5.5 [PH] (ref 4.5–8)
PROT UR STRIP-MCNC: NEGATIVE MG/DL
SP GR UR STRIP.AUTO: 1.01 (ref 1–1.03)
UROBILINOGEN UR QL STRIP.AUTO: 0.2 E.U./DL

## 2018-04-27 PROCEDURE — 81003 URINALYSIS AUTO W/O SCOPE: CPT | Performed by: SURGERY

## 2018-04-27 PROCEDURE — 99024 POSTOP FOLLOW-UP VISIT: CPT | Performed by: SURGERY

## 2018-04-27 NOTE — PROGRESS NOTES
Assessment/Plan:   Amna Porter is a 43 y o male who comes in today for postoperative check after  Laparoscopic appendectomy on 18  Mild abdominal pain and dysuria    Pathology: Reviewed with patient, all questions answered  Acute appendicitis    Will check UA with reflex to culture  Urology consult  Postoperative restrictions reviewed  All questions answered  ______________________________________________________  HPI:  Amna Porter is a 43 y o male who comes in today for postoperative check after recent  on   Currently doing well with some problems : mild abdominal pain and dysuria, no fever or chills,no nausea and no vomiting  Reports dysuria  ROS:  General ROS: negative for - chills, fatigue, fever or night sweats, weight loss  Respiratory ROS: no cough, shortness of breath, or wheezing  Cardiovascular ROS: no chest pain or dyspnea on exertion  Genito-Urinary ROS: no dysuria, trouble voiding, or hematuria  Musculoskeletal ROS: negative for - gait disturbance, joint pain or muscle pain  Neurological ROS: no TIA or stroke symptoms  GI ROS: see HPI  Skin ROS: no new rashes or lesions   Lymphatic ROS: no new adenopathy noted by pt  GYN ROS: see HPI, no new GYN history or bleeding noted  Psy ROS: no new mental or behavioral disturbances         Patient Active Problem List   Diagnosis    Acute appendicitis       Allergies:  Patient has no known allergies        Current Outpatient Prescriptions:     HYDROcodone-acetaminophen (NORCO) 5-325 mg per tablet, Take 1-2 tablets by mouth every 6 (six) hours as needed for pain for up to 30 doses Max Daily Amount: 8 tablets, Disp: 30 tablet, Rfl: 0    Past Medical History:   Diagnosis Date    Known health problems: none        Past Surgical History:   Procedure Laterality Date    ID LAP,APPENDECTOMY N/A 2018    Procedure: APPENDECTOMY LAPAROSCOPIC;  Surgeon: Damon Garcia MD;  Location: [facility];  Service: General    SHOULDER ARTHROSCOPY Bilateral        Family History   Problem Relation Age of Onset    No Known Problems Family         reports that he has been smoking Cigars  He has never used smokeless tobacco  He reports that he drinks alcohol  He reports that he does not use drugs  PHYSICAL EXAM  General: normal, cooperative, no distress  Abdominal: soft, nondistended or nontender  Incision: clean, dry, and intact and healing well    Some portions of this record may have been generated with voice recognition software  There may be translation, syntax,  or grammatical errors  Occasional wrong word or "sound-a-like" substitutions may have occurred due to the inherent limitations of the voice recognition software  Read the chart carefully and recognize, using context, where substitutions may have occurred  If you have any questions, please contact the dictating provider for clarification or correction, as needed  This encounter has been coded by a non-certified coder         Quita Crigler, MD    Date: 4/27/2018 Time: 9:31 AM

## 2018-04-30 ENCOUNTER — OFFICE VISIT (OUTPATIENT)
Dept: UROLOGY | Facility: AMBULATORY SURGERY CENTER | Age: 38
End: 2018-04-30
Payer: COMMERCIAL

## 2018-04-30 VITALS
HEIGHT: 73 IN | WEIGHT: 224 LBS | BODY MASS INDEX: 29.69 KG/M2 | DIASTOLIC BLOOD PRESSURE: 80 MMHG | SYSTOLIC BLOOD PRESSURE: 160 MMHG

## 2018-04-30 DIAGNOSIS — R30.0 DYSURIA: ICD-10-CM

## 2018-04-30 DIAGNOSIS — Z98.52 S/P VASECTOMY: Primary | ICD-10-CM

## 2018-04-30 LAB
POST-VOID RESIDUAL VOLUME, ML POC: 65 ML
SL AMB  POCT GLUCOSE, UA: NORMAL
SL AMB LEUKOCYTE ESTERASE,UA: NORMAL
SL AMB POCT BILIRUBIN,UA: NORMAL
SL AMB POCT BLOOD,UA: NORMAL
SL AMB POCT CLARITY,UA: NORMAL
SL AMB POCT COLOR,UA: YELLOW
SL AMB POCT KETONES,UA: NORMAL
SL AMB POCT NITRITE,UA: NORMAL
SL AMB POCT PH,UA: 7.5
SL AMB POCT SPECIFIC GRAVITY,UA: 1
SL AMB POCT URINE PROTEIN: NORMAL
SL AMB POCT UROBILINOGEN: NORMAL

## 2018-04-30 PROCEDURE — 81002 URINALYSIS NONAUTO W/O SCOPE: CPT | Performed by: NURSE PRACTITIONER

## 2018-04-30 PROCEDURE — 99213 OFFICE O/P EST LOW 20 MIN: CPT | Performed by: NURSE PRACTITIONER

## 2018-04-30 PROCEDURE — 87086 URINE CULTURE/COLONY COUNT: CPT | Performed by: NURSE PRACTITIONER

## 2018-04-30 PROCEDURE — 51798 US URINE CAPACITY MEASURE: CPT | Performed by: NURSE PRACTITIONER

## 2018-04-30 NOTE — PROGRESS NOTES
4/30/2018    Sander Pea  1980  [id number]        Assessment  Bladder spasms/urinary frequency  S/p vasectomy (11/22/2017)    Discussion  Marcelino Hinds is a 43 y o  male being managed by Dr Ferrera  A bladder ultrasound was obtained today in the office and PVR was 65 ml  There is no evidence of infection or microscopic hematuria on urine dip today in the office or prior urinalysis  An urine culture will be checked to ensure there is no bacterial growth  We discussed that his symptoms are most likely reactive to recent surgery and should improve over the next few weeks  We discussed the use of an anticholingeric but he defers  He was instructed on avoiding bladder irritants  He was instructed to call if symptoms do not improve at which time cystoscopy will be recommended  We also discussed semen analysis testing as he has not had this complete to confirm sterility  He will call to obtain results  All questions answered  History of Present Illness  43 y o  male with a history of vasectomy (11/22/2017) presents today with complaints of urinary frequency and bladder pressure  Patient is s/p appendectomy about 2 weeks ago  He states once the amaro was removed he developed dysuria and bladder spasms  He complains of urinary frequency, nocturia 1 to 2 times a night and urgency  He describes it as a burning sensation in the bladder with urination but denies urethral discomfort  He denies any fevers  He denies any gross hematuria  His bowel movements are regular  He denies any abdominal distention, nausea, or vomiting  He denies any prior history of UTI's  Review of Systems  Review of Systems   Constitutional: Negative  HENT: Negative  Respiratory: Negative  Cardiovascular: Negative  Gastrointestinal: Negative  Genitourinary:        As per HPI   Musculoskeletal: Negative  Skin: Negative  Neurological: Negative  Hematological: Negative        Urinary Incontinence Screening      Most Recent Value   Urinary Incontinence   Urinary Incontinence? No   Urinary frequency? Yes   Urinary urgency? Yes   Urinary hesitancy? Yes   Nocturia (waking up to use the bathroom)? Yes [2x]   Straining (having to push to go)? Yes   Weak stream?  Yes   Intermittent stream?  Yes   Post void dribbling? No          Past Medical History  Past Medical History:   Diagnosis Date    Known health problems: none        Past Surgical History  Past Surgical History:   Procedure Laterality Date    [place] LAP,APPENDECTOMY N/A 4/18/2018    Procedure: APPENDECTOMY LAPAROSCOPIC;  Surgeon: Kemar Berger MD;  Location: [facility] OR;  Service: Audery Grout ARTHROSCOPY Sanjay Smallwood        Past Family History  Family History   Problem Relation Age of Onset    No Known Problems Family        Past Social history  Social History     Social History    Marital status: /Civil Union     Spouse name: N/A    Number of children: N/A    Years of education: N/A     Occupational History    Not on file  Social History Main Topics    Smoking status: Light Tobacco Smoker     Types: Cigars    Smokeless tobacco: Never Used    Alcohol use Yes      Comment: socially    Drug use: No    Sexual activity: Not on file     Other Topics Concern    Not on file     Social History Narrative    No narrative on file       Current Medications  Current Outpatient Prescriptions   Medication Sig Dispense Refill    HYDROcodone-acetaminophen (NORCO) 5-325 mg per tablet Take 1-2 tablets by mouth every 6 (six) hours as needed for pain for up to 30 doses Max Daily Amount: 8 tablets 30 tablet 0     No current facility-administered medications for this visit  Allergies  No Known Allergies    Past Medical History, Social History, Family History, medications and allergies were reviewed      Vitals  Vitals:    04/30/18 1112   BP: 160/80   BP Location: Left arm   Patient Position: Sitting   Weight: 102 kg (224 lb)   Height: 6' 1" (1 854 m)       Physical Exam  Skin: warm, dry, intact  Pulmonary: Non-labored breathing  Abdomen: Soft, non-tender, non-distended  Musculoskeletal: AROM with no joint deformity or tenderness    Neurology: Alert and oriented        Results    Lab Results   Component Value Date    GLUCOSE 137 04/19/2018    CALCIUM 8 3 04/19/2018     04/19/2018    K 4 5 04/19/2018    CO2 26 04/19/2018     04/19/2018    BUN 10 04/19/2018    CREATININE 0 87 04/19/2018     Lab Results   Component Value Date    WBC 12 90 (H) 04/19/2018    HGB 13 7 04/19/2018    HCT 39 3 04/19/2018    MCV 85 04/19/2018     04/19/2018       Recent Results (from the past 1 hour(s))   POCT Measure PVR    Collection Time: 04/30/18 11:44 AM   Result Value Ref Range    POST-VOID RESIDUAL VOLUME, ML POC 65 mL   ]  Component      Latest Ref Rng & Units 4/30/2018   LEUKOCYTE ESTERASE,UA       -   NITRITE,UA       -   SL AMB POCT UROBILINOGEN       -   SL AMB POCT URINE PROTEIN       -   PH,UA       7 5   BLOOD,UA       -   SPECIFIC GRAVITY,UA       1 005   KETONES,UA       -   BILIRUBIN,UA       -   GLUCOSE, UA       -   COLOR,UA       yellow   CLARITY,UA       transparent

## 2018-05-01 LAB — BACTERIA UR CULT: NORMAL

## 2021-08-05 ENCOUNTER — IMMUNIZATIONS (OUTPATIENT)
Dept: FAMILY MEDICINE CLINIC | Facility: HOSPITAL | Age: 41
End: 2021-08-05

## 2021-08-05 PROCEDURE — 91303: CPT

## 2021-08-05 PROCEDURE — 0031A: CPT

## 2022-05-25 ENCOUNTER — NEW PATIENT (OUTPATIENT)
Dept: URBAN - METROPOLITAN AREA CLINIC 6 | Facility: CLINIC | Age: 42
End: 2022-05-25

## 2022-05-25 DIAGNOSIS — H00.14: ICD-10-CM

## 2022-05-25 PROCEDURE — 92002 INTRM OPH EXAM NEW PATIENT: CPT

## 2022-05-25 ASSESSMENT — VISUAL ACUITY
OS_CC: 20/25
OD_CC: 20/20

## 2022-05-25 ASSESSMENT — TONOMETRY
OS_IOP_MMHG: 19
OD_IOP_MMHG: 21

## (undated) DEVICE — CLOSURE DEVICE ENDO CLOSE

## (undated) DEVICE — IRRIG ENDO FLO TUBING

## (undated) DEVICE — 3M™ STERI-STRIP™ COMPOUND BENZOIN TINCTURE 40 BAGS/CARTON 4 CARTONS/CASE C1544: Brand: 3M™ STERI-STRIP™

## (undated) DEVICE — ETS45 RELOAD STANDARD 45MM: Brand: ENDOPATH

## (undated) DEVICE — ALLENTOWN LAP CHOLE APP PACK: Brand: CARDINAL HEALTH

## (undated) DEVICE — 3M™ STERI-STRIP™ REINFORCED ADHESIVE SKIN CLOSURES, R1547, 1/2 IN X 4 IN (12 MM X 100 MM), 6 STRIPS/ENVELOPE: Brand: 3M™ STERI-STRIP™

## (undated) DEVICE — ENDOPATH XCEL BLADELESS TROCARS WITH STABILITY SLEEVES: Brand: ENDOPATH XCEL

## (undated) DEVICE — 2963 MEDIPORE SOFT CLOTH TAPE 3 IN X 10 YD 12 RLS/CS: Brand: 3M™ MEDIPORE™

## (undated) DEVICE — SUT PDS II 1 CT-1 27 IN Z347H

## (undated) DEVICE — SUT MONOCRYL 4-0 PS-2 27 IN Y426H

## (undated) DEVICE — TELFA NON-ADHERENT ABSORBENT DRESSING: Brand: TELFA

## (undated) DEVICE — ENDOPATH XCEL UNIVERSAL TROCAR STABLILITY SLEEVES: Brand: ENDOPATH XCEL

## (undated) DEVICE — GLOVE SRG BIOGEL 7

## (undated) DEVICE — LAPAROSCOPIC SMOKE EVAC TUBING

## (undated) DEVICE — SCD SEQUENTIAL COMPRESSION COMFORT SLEEVE MEDIUM KNEE LENGTH: Brand: KENDALL SCD

## (undated) DEVICE — BLUE HEAT SCOPE WARMER

## (undated) DEVICE — GLOVE INDICATOR PI UNDERGLOVE SZ 6.5 BLUE

## (undated) DEVICE — [HIGH FLOW INSUFFLATOR,  DO NOT USE IF PACKAGE IS DAMAGED,  KEEP DRY,  KEEP AWAY FROM SUNLIGHT,  PROTECT FROM HEAT AND RADIOACTIVE SOURCES.]: Brand: PNEUMOSURE

## (undated) DEVICE — GLOVE INDICATOR PI UNDERGLOVE SZ 7 BLUE

## (undated) DEVICE — GLOVE SRG BIOGEL 6.5

## (undated) DEVICE — CHLORAPREP HI-LITE 26ML ORANGE

## (undated) DEVICE — ENDOPATH ETS-FLEX45 ARTICULATING ENDOSCOPIC LINEAR CUTTER, NO RELOAD: Brand: ENDOPATH

## (undated) DEVICE — 3000CC GUARDIAN II: Brand: GUARDIAN

## (undated) DEVICE — INTENDED FOR TISSUE SEPARATION, AND OTHER PROCEDURES THAT REQUIRE A SHARP SURGICAL BLADE TO PUNCTURE OR CUT.: Brand: BARD-PARKER SAFETY BLADES SIZE 15, STERILE

## (undated) DEVICE — TRAY FOLEY 16FR URIMETER SURESTEP

## (undated) DEVICE — HARMONIC ACE 5MM DIAMETER SHEARS 36CM SHAFT LENGTH + ADAPTIVE TISSUE TECHNOLOGY FOR USE WITH GENERATOR G11: Brand: HARMONIC ACE

## (undated) DEVICE — ENDOPOUCH RETRIEVER SPECIMEN RETRIEVAL BAGS: Brand: ENDOPOUCH RETRIEVER

## (undated) RX ORDER — NEOMYCIN SULFATE, POLYMYXIN B SULFATE AND DEXAMETHASONE 3.5; 10000; 1 MG/ML; [USP'U]/ML; MG/ML: 1 SUSPENSION OPHTHALMIC